# Patient Record
Sex: FEMALE | Race: WHITE | Employment: FULL TIME | URBAN - METROPOLITAN AREA
[De-identification: names, ages, dates, MRNs, and addresses within clinical notes are randomized per-mention and may not be internally consistent; named-entity substitution may affect disease eponyms.]

---

## 2019-01-07 ENCOUNTER — OFFICE VISIT (OUTPATIENT)
Dept: ORTHOPEDIC SURGERY | Facility: CLINIC | Age: 44
End: 2019-01-07

## 2019-01-07 VITALS
BODY MASS INDEX: 46.38 KG/M2 | HEART RATE: 71 BPM | DIASTOLIC BLOOD PRESSURE: 56 MMHG | OXYGEN SATURATION: 99 % | RESPIRATION RATE: 16 BRPM | TEMPERATURE: 97.6 F | HEIGHT: 62 IN | SYSTOLIC BLOOD PRESSURE: 120 MMHG | WEIGHT: 252 LBS

## 2019-01-07 DIAGNOSIS — S90.121A CONTUSION OF SECOND TOE OF RIGHT FOOT, INITIAL ENCOUNTER: ICD-10-CM

## 2019-01-07 DIAGNOSIS — M79.674 TOE PAIN, RIGHT: Primary | ICD-10-CM

## 2019-01-07 RX ORDER — ESCITALOPRAM OXALATE 10 MG/1
5 TABLET ORAL
Refills: 0 | COMMUNITY
Start: 2018-10-30

## 2019-01-07 RX ORDER — LEVOTHYROXINE SODIUM 137 UG/1
TABLET ORAL
Refills: 0 | COMMUNITY
Start: 2018-12-09

## 2019-01-07 RX ORDER — BUPROPION HYDROCHLORIDE 300 MG/1
TABLET ORAL
Refills: 0 | COMMUNITY
Start: 2018-12-09

## 2019-01-07 NOTE — PROGRESS NOTES
HISTORY OF PRESENT ILLNESS:  Kristina Fox is a pleasant, 56-year-old, obese,  female who presents to the office status post tripping event on January 1, 2019. She was carrying [employer] items down a set of stairs when she lost her balance hyperextending the front of her right foot including all toes. Since the injury occurrence, she has had persistent pain with recent worsening of the right second toe base. She denies any history of trauma or pain associated with the great second toe. She also notes the length of the right second toe when compared to the left is longer. She also notes a crooked toe associated with the right second toe. REVIEW OF SYSTEMS:   No chest pain. No shortness of breath. No fever, chills, or night sweats. No rash and no itching. No nausea or vomiting. The patient is not a diabetic. She is not allergic to medication. PHYSICAL EXAM:  She is a healthy-appearing, well-developed, well-nourished, pleasant, 56-year-old, severely morbidly obese,  female, atraumatic, normocephalic, alert and oriented times three sitting on the table comfortably. Examination of the bilateral feet reveal no fracture deformity, lesions, masses, or step-offs. The right second toe is longer than the left second toe in comparison. There is no abnormal angulation or fracture deformity of the right second toe. There is tenderness at the second MTP joint. The patient does have active flexion and extension of all toes of the right foot. Plantarflexion with pressure just proximal to the second MTP joint does reproduce pain to the top of the right, second toe base. Distal sensation is intact fully to the right lower extremity. Capillary refill is brisk and less than 2 seconds to the right lower extremity. RADIOGRAPHS:  X-rays, today, three views of the right foot, reveal no fracture deformity, lesions, masses, or step-offs.   There are no osseous deformities associated with the right foot. IMPRESSION:      1. Contusion of the right second toe base. 2. Right foot pain. PLAN:   The patient was placed in a postop shoe. She will use that shoe when ambulating. We will see her back in about three weeks. She does not have to sleep in the shoe. Today, all her questions were answered to her satisfaction. A copy of her x-rays was reviewed and provided.

## 2022-02-18 PROBLEM — R11.2 NAUSEA AND VOMITING: Status: ACTIVE | Noted: 2022-02-18

## 2022-02-18 PROBLEM — R10.13 INTRACTABLE EPIGASTRIC ABDOMINAL PAIN: Status: ACTIVE | Noted: 2022-02-18

## 2022-02-18 PROBLEM — K80.20 CHOLELITHIASES: Status: ACTIVE | Noted: 2022-02-18

## 2022-03-18 PROBLEM — R10.13 INTRACTABLE EPIGASTRIC ABDOMINAL PAIN: Status: ACTIVE | Noted: 2022-02-18

## 2022-03-18 PROBLEM — R11.2 NAUSEA AND VOMITING: Status: ACTIVE | Noted: 2022-02-18

## 2022-03-20 PROBLEM — K80.20 CHOLELITHIASES: Status: ACTIVE | Noted: 2022-02-18

## 2023-08-18 ENCOUNTER — NEW PATIENT (OUTPATIENT)
Dept: URBAN - METROPOLITAN AREA CLINIC 1 | Facility: CLINIC | Age: 48
End: 2023-08-18

## 2023-08-18 DIAGNOSIS — H52.13: ICD-10-CM

## 2023-08-18 DIAGNOSIS — H52.4: ICD-10-CM

## 2023-08-18 PROCEDURE — 92004 COMPRE OPH EXAM NEW PT 1/>: CPT

## 2023-08-18 PROCEDURE — 92015 DETERMINE REFRACTIVE STATE: CPT

## 2023-08-18 ASSESSMENT — VISUAL ACUITY
OD_CC: 20/20
OS_BAT: 20/30
OS_SC: J7
OD_SC: 20/30
OD_BAT: 20/30
OS_SC: 20/20
OD_SC: J5
OS_CC: 20/25

## 2023-08-18 ASSESSMENT — TONOMETRY
OD_IOP_MMHG: 16
OS_IOP_MMHG: 16

## 2024-08-19 ENCOUNTER — COMPREHENSIVE EXAM (OUTPATIENT)
Dept: URBAN - METROPOLITAN AREA CLINIC 1 | Facility: CLINIC | Age: 49
End: 2024-08-19

## 2024-08-19 DIAGNOSIS — Z01.00: ICD-10-CM

## 2024-08-19 DIAGNOSIS — H52.4: ICD-10-CM

## 2024-08-19 DIAGNOSIS — H52.13: ICD-10-CM

## 2024-08-19 DIAGNOSIS — H52.222: ICD-10-CM

## 2024-08-19 PROCEDURE — 92014 COMPRE OPH EXAM EST PT 1/>: CPT

## 2024-08-19 PROCEDURE — 92015 DETERMINE REFRACTIVE STATE: CPT

## 2024-08-19 ASSESSMENT — TONOMETRY
OD_IOP_MMHG: 15
OS_IOP_MMHG: 16

## 2024-08-19 ASSESSMENT — VISUAL ACUITY
OD_CC: 20/30
OS_CC: 20/30

## 2025-08-20 ENCOUNTER — COMPREHENSIVE EXAM (OUTPATIENT)
Age: 50
End: 2025-08-20

## 2025-08-20 DIAGNOSIS — Z01.00: ICD-10-CM

## 2025-08-20 DIAGNOSIS — H52.222: ICD-10-CM

## 2025-08-20 DIAGNOSIS — H52.13: ICD-10-CM

## 2025-08-20 DIAGNOSIS — H52.4: ICD-10-CM

## 2025-08-20 PROCEDURE — 92014 COMPRE OPH EXAM EST PT 1/>: CPT

## 2025-08-20 PROCEDURE — 92015 DETERMINE REFRACTIVE STATE: CPT
